# Patient Record
Sex: MALE | Race: WHITE | NOT HISPANIC OR LATINO | URBAN - METROPOLITAN AREA
[De-identification: names, ages, dates, MRNs, and addresses within clinical notes are randomized per-mention and may not be internally consistent; named-entity substitution may affect disease eponyms.]

---

## 2021-01-22 ENCOUNTER — IMMUNIZATION (OUTPATIENT)
Dept: PRIMARY CARE CLINIC | Facility: CLINIC | Age: 76
End: 2021-01-22
Payer: OTHER GOVERNMENT

## 2021-01-22 DIAGNOSIS — Z23 NEED FOR VACCINATION: Primary | ICD-10-CM

## 2021-01-22 PROCEDURE — 91300 COVID-19, MRNA, LNP-S, PF, 30 MCG/0.3 ML DOSE VACCINE: CPT | Mod: PBBFAC,PN

## 2021-02-12 ENCOUNTER — IMMUNIZATION (OUTPATIENT)
Dept: PRIMARY CARE CLINIC | Facility: CLINIC | Age: 76
End: 2021-02-12
Payer: OTHER GOVERNMENT

## 2021-02-12 DIAGNOSIS — Z23 NEED FOR VACCINATION: Primary | ICD-10-CM

## 2021-02-12 PROCEDURE — 91300 COVID-19, MRNA, LNP-S, PF, 30 MCG/0.3 ML DOSE VACCINE: CPT | Mod: PBBFAC,PN

## 2021-02-12 PROCEDURE — 0002A COVID-19, MRNA, LNP-S, PF, 30 MCG/0.3 ML DOSE VACCINE: CPT | Mod: PBBFAC,PN

## 2021-12-17 ENCOUNTER — IMMUNIZATION (OUTPATIENT)
Dept: PRIMARY CARE CLINIC | Facility: CLINIC | Age: 76
End: 2021-12-17
Payer: MEDICARE

## 2021-12-17 DIAGNOSIS — Z23 NEED FOR VACCINATION: Primary | ICD-10-CM

## 2021-12-17 PROCEDURE — 0004A COVID-19, MRNA, LNP-S, PF, 30 MCG/0.3 ML DOSE VACCINE: CPT | Mod: PBBFAC,PN

## 2024-10-24 ENCOUNTER — CLINICAL SUPPORT (OUTPATIENT)
Dept: AUDIOLOGY | Facility: CLINIC | Age: 79
End: 2024-10-24
Payer: MEDICARE

## 2024-10-24 DIAGNOSIS — H91.90 HEARING LOSS, UNSPECIFIED HEARING LOSS TYPE, UNSPECIFIED LATERALITY: Primary | ICD-10-CM

## 2024-10-24 NOTE — PROGRESS NOTES
HEARING AID CONSULTATION  Chavez Parra was seen today for a hearing aid consultation.  The patient has O[employer] Pro RITE hearing aids that [facility].  He reported he only wore them for one day.  The patient has not had an audiogram at our clinic recently.  I advised him that he needs an audiogram in order for me to adequately re-program his current hearing aids or purchase new hearing aids.  I recommended the patient look into purchasing new hearing aids due to the age of his current hearing aids.  I advised him that [facility] can return [facility] to [facility] new [facility] and to reprogram his current aids or he can purchase hearing aids at our clinic.  We discussed our hearing aid transfer policy.  A hearing test was not performed today as the patient needs a referral from a physician for insurance to cover the cost of the hearing test.  I recommended he either get a referral for a hearing test from [facility] at [facility] or he schedule an ENT visit at our clinic so that he can receive a referral for a hearing test.  Hearing aid options/recommendations, the 30 day trial period, and the $250 deposit were discussed with the patient.  I advised the patient that we cannot accept insurance for hearing aids.  Entry level hearing aids via the Itemized package were recommended for the patient.  Patient has an upcoming appointment with his PCP and would like to discuss a hearing test with them.  Patient was given my card and advised to contact me if he has any questions or needs anything.         Recommendations:  Wear hearing protection in noise  Annual audiogram or sooner if change in hearing is perceived   Hearing aid follow-up with dispensing audiologist  Consider purchasing new hearing aids